# Patient Record
Sex: MALE | Race: WHITE | Employment: FULL TIME | URBAN - NONMETROPOLITAN AREA
[De-identification: names, ages, dates, MRNs, and addresses within clinical notes are randomized per-mention and may not be internally consistent; named-entity substitution may affect disease eponyms.]

---

## 2023-01-28 ENCOUNTER — APPOINTMENT (OUTPATIENT)
Dept: GENERAL RADIOLOGY | Age: 63
End: 2023-01-28
Payer: COMMERCIAL

## 2023-01-28 ENCOUNTER — HOSPITAL ENCOUNTER (EMERGENCY)
Age: 63
Discharge: ANOTHER HOSPITAL | End: 2023-01-28
Attending: EMERGENCY MEDICINE
Payer: COMMERCIAL

## 2023-01-28 VITALS
BODY MASS INDEX: 29.52 KG/M2 | WEIGHT: 230 LBS | DIASTOLIC BLOOD PRESSURE: 85 MMHG | RESPIRATION RATE: 14 BRPM | HEIGHT: 74 IN | SYSTOLIC BLOOD PRESSURE: 120 MMHG | TEMPERATURE: 97.6 F | OXYGEN SATURATION: 97 % | HEART RATE: 69 BPM

## 2023-01-28 DIAGNOSIS — I21.4 NON-STEMI (NON-ST ELEVATED MYOCARDIAL INFARCTION) (HCC): Primary | ICD-10-CM

## 2023-01-28 LAB
ALBUMIN SERPL BCP-MCNC: 4.3 GM/DL (ref 3.4–5)
ALP SERPL-CCNC: 69 U/L (ref 46–116)
ALT SERPL W P-5'-P-CCNC: 35 U/L (ref 14–63)
ANION GAP SERPL CALC-SCNC: 9 MEQ/L (ref 8–16)
APTT: 29.1 SECONDS (ref 22–38)
AST SERPL W P-5'-P-CCNC: 28 U/L (ref 15–37)
BASOPHILS # BLD: 0.3 % (ref 0–3)
BASOPHILS ABSOLUTE: 0 THOU/MM3 (ref 0–0.1)
BILIRUB SERPL-MCNC: 0.5 MG/DL (ref 0.2–1)
BUN SERPL-MCNC: 17 MG/DL (ref 7–18)
CALCIUM SERPL-MCNC: 8.8 MG/DL (ref 8.5–10.1)
CHLORIDE SERPL-SCNC: 102 MEQ/L (ref 98–107)
CO2 SERPL-SCNC: 31 MEQ/L (ref 21–32)
CREAT SERPL-MCNC: 1.1 MG/DL (ref 0.6–1.3)
EKG ATRIAL RATE: 77 BPM
EKG ATRIAL RATE: 91 BPM
EKG P AXIS: 31 DEGREES
EKG P AXIS: 36 DEGREES
EKG P-R INTERVAL: 158 MS
EKG P-R INTERVAL: 164 MS
EKG Q-T INTERVAL: 364 MS
EKG Q-T INTERVAL: 386 MS
EKG QRS DURATION: 92 MS
EKG QRS DURATION: 94 MS
EKG QTC CALCULATION (BAZETT): 436 MS
EKG QTC CALCULATION (BAZETT): 447 MS
EKG R AXIS: 28 DEGREES
EKG R AXIS: 42 DEGREES
EKG T AXIS: 14 DEGREES
EKG T AXIS: 24 DEGREES
EKG VENTRICULAR RATE: 77 BPM
EKG VENTRICULAR RATE: 91 BPM
EOSINOPHILS ABSOLUTE: 0.1 THOU/MM3 (ref 0–0.5)
EOSINOPHILS RELATIVE PERCENT: 0.7 % (ref 0–4)
GFR SERPL CREATININE-BSD FRML MDRD: > 60 ML/MIN/1.73M2
GLUCOSE SERPL-MCNC: 102 MG/DL (ref 74–106)
HCT VFR BLD CALC: 43.6 % (ref 42–52)
HEMOGLOBIN: 15.2 GM/DL (ref 14–18)
IMMATURE GRANS (ABS): 0.01 THOU/MM3 (ref 0–0.07)
IMMATURE GRANULOCYTES: 0 %
INR BLD: 1.12 (ref 0.85–1.13)
LYMPHOCYTES # BLD AUTO: 16.8 % (ref 15–47)
LYMPHOCYTES ABSOLUTE: 1.3 THOU/MM3 (ref 1–4.8)
MCH RBC QN AUTO: 30.6 PG (ref 26–32)
MCHC RBC AUTO-ENTMCNC: 34.9 GM/DL (ref 31–35)
MCV RBC AUTO: 87.9 FL (ref 80–94)
MONOCYTES: 0.6 THOU/MM3 (ref 0.3–1.3)
MONOCYTES: 7.7 % (ref 0–12)
PDW BLD-RTO: 12.6 % (ref 11.5–14.9)
PLATELET # BLD AUTO: 174 THOU/MM3 (ref 130–400)
PMV BLD AUTO: 11 FL (ref 9.4–12.4)
POTASSIUM SERPL-SCNC: 3.5 MEQ/L (ref 3.5–5.1)
PROT SERPL-MCNC: 7.7 GM/DL (ref 6.4–8.2)
RBC # BLD: 4.96 MILL/MM3 (ref 4.5–6.1)
SEG NEUTROPHILS: 74.4 % (ref 43–75)
SEGMENTED NEUTROPHILS ABSOLUTE COUNT: 5.7 THOU/MM3 (ref 1.8–7.7)
SODIUM SERPL-SCNC: 142 MEQ/L (ref 136–145)
TROPONIN, HIGH SENSITIVITY: 290 PG/ML (ref 0–76.1)
TROPONIN, HIGH SENSITIVITY: 320.7 PG/ML (ref 0–76.1)
WBC # BLD: 7.7 THOU/MM3 (ref 4.8–10.8)

## 2023-01-28 PROCEDURE — 85025 COMPLETE CBC W/AUTO DIFF WBC: CPT

## 2023-01-28 PROCEDURE — 93005 ELECTROCARDIOGRAM TRACING: CPT | Performed by: EMERGENCY MEDICINE

## 2023-01-28 PROCEDURE — 84484 ASSAY OF TROPONIN QUANT: CPT

## 2023-01-28 PROCEDURE — 6360000002 HC RX W HCPCS

## 2023-01-28 PROCEDURE — 85730 THROMBOPLASTIN TIME PARTIAL: CPT

## 2023-01-28 PROCEDURE — 93010 ELECTROCARDIOGRAM REPORT: CPT | Performed by: INTERNAL MEDICINE

## 2023-01-28 PROCEDURE — 36415 COLL VENOUS BLD VENIPUNCTURE: CPT

## 2023-01-28 PROCEDURE — 80053 COMPREHEN METABOLIC PANEL: CPT

## 2023-01-28 PROCEDURE — 99285 EMERGENCY DEPT VISIT HI MDM: CPT

## 2023-01-28 PROCEDURE — 85610 PROTHROMBIN TIME: CPT

## 2023-01-28 PROCEDURE — 96374 THER/PROPH/DIAG INJ IV PUSH: CPT

## 2023-01-28 PROCEDURE — 6370000000 HC RX 637 (ALT 250 FOR IP): Performed by: EMERGENCY MEDICINE

## 2023-01-28 PROCEDURE — 71045 X-RAY EXAM CHEST 1 VIEW: CPT

## 2023-01-28 RX ORDER — HEPARIN SODIUM 1000 [USP'U]/ML
60 INJECTION, SOLUTION INTRAVENOUS; SUBCUTANEOUS PRN
Status: DISCONTINUED | OUTPATIENT
Start: 2023-01-28 | End: 2023-01-28

## 2023-01-28 RX ORDER — HEPARIN SODIUM 1000 [USP'U]/ML
30 INJECTION, SOLUTION INTRAVENOUS; SUBCUTANEOUS PRN
Status: DISCONTINUED | OUTPATIENT
Start: 2023-01-28 | End: 2023-01-28

## 2023-01-28 RX ORDER — HEPARIN SODIUM 10000 [USP'U]/100ML
5-30 INJECTION, SOLUTION INTRAVENOUS CONTINUOUS
Status: DISCONTINUED | OUTPATIENT
Start: 2023-01-28 | End: 2023-01-28 | Stop reason: HOSPADM

## 2023-01-28 RX ORDER — HEPARIN SODIUM 10000 [USP'U]/100ML
INJECTION, SOLUTION INTRAVENOUS
Status: COMPLETED
Start: 2023-01-28 | End: 2023-01-28

## 2023-01-28 RX ORDER — HEPARIN SODIUM 1000 [USP'U]/ML
4000 INJECTION, SOLUTION INTRAVENOUS; SUBCUTANEOUS PRN
Status: DISCONTINUED | OUTPATIENT
Start: 2023-01-28 | End: 2023-01-28 | Stop reason: HOSPADM

## 2023-01-28 RX ORDER — HEPARIN SODIUM 1000 [USP'U]/ML
2000 INJECTION, SOLUTION INTRAVENOUS; SUBCUTANEOUS PRN
Status: DISCONTINUED | OUTPATIENT
Start: 2023-01-28 | End: 2023-01-28 | Stop reason: HOSPADM

## 2023-01-28 RX ORDER — HEPARIN SODIUM 1000 [USP'U]/ML
4000 INJECTION, SOLUTION INTRAVENOUS; SUBCUTANEOUS ONCE
Status: DISCONTINUED | OUTPATIENT
Start: 2023-01-28 | End: 2023-01-28 | Stop reason: HOSPADM

## 2023-01-28 RX ORDER — CLOPIDOGREL 300 MG/1
300 TABLET, FILM COATED ORAL ONCE
Status: COMPLETED | OUTPATIENT
Start: 2023-01-28 | End: 2023-01-28

## 2023-01-28 RX ORDER — HEPARIN SODIUM 10000 [USP'U]/100ML
14 INJECTION, SOLUTION INTRAVENOUS CONTINUOUS
Status: DISCONTINUED | OUTPATIENT
Start: 2023-01-28 | End: 2023-01-28 | Stop reason: DRUGHIGH

## 2023-01-28 RX ORDER — HEPARIN SODIUM 1000 [USP'U]/ML
INJECTION, SOLUTION INTRAVENOUS; SUBCUTANEOUS
Status: COMPLETED
Start: 2023-01-28 | End: 2023-01-28

## 2023-01-28 RX ORDER — HEPARIN SODIUM 1000 [USP'U]/ML
4000 INJECTION, SOLUTION INTRAVENOUS; SUBCUTANEOUS ONCE
Status: DISCONTINUED | OUTPATIENT
Start: 2023-01-28 | End: 2023-01-28 | Stop reason: SDUPTHER

## 2023-01-28 RX ADMIN — HEPARIN SODIUM 9 UNITS/KG/HR: 10000 INJECTION, SOLUTION INTRAVENOUS at 15:48

## 2023-01-28 RX ADMIN — HEPARIN SODIUM 4000 UNITS: 1000 INJECTION, SOLUTION INTRAVENOUS; SUBCUTANEOUS at 15:45

## 2023-01-28 RX ADMIN — HEPARIN SODIUM AND DEXTROSE 9 UNITS/KG/HR: 10000; 5 INJECTION INTRAVENOUS at 15:48

## 2023-01-28 RX ADMIN — CLOPIDOGREL BISULFATE 300 MG: 300 TABLET, FILM COATED ORAL at 15:44

## 2023-01-28 ASSESSMENT — ENCOUNTER SYMPTOMS
ABDOMINAL PAIN: 0
NAUSEA: 0
COUGH: 0
WHEEZING: 0

## 2023-01-28 ASSESSMENT — PAIN DESCRIPTION - LOCATION: LOCATION: CHEST

## 2023-01-28 ASSESSMENT — PAIN - FUNCTIONAL ASSESSMENT: PAIN_FUNCTIONAL_ASSESSMENT: 0-10

## 2023-01-28 ASSESSMENT — PAIN DESCRIPTION - ORIENTATION: ORIENTATION: LEFT

## 2023-01-28 ASSESSMENT — PAIN DESCRIPTION - PAIN TYPE: TYPE: ACUTE PAIN

## 2023-01-28 ASSESSMENT — PAIN SCALES - GENERAL: PAINLEVEL_OUTOF10: 3

## 2023-01-28 ASSESSMENT — PAIN DESCRIPTION - DESCRIPTORS: DESCRIPTORS: ACHING

## 2023-01-28 ASSESSMENT — HEART SCORE
ECG: 1
ECG: 1

## 2023-01-28 NOTE — ED PROVIDER NOTES
[facility]  eMERGENCY dEPARTMENT eNCOUnter             [place] [address] COMPLAINT    Chief Complaint   Patient presents with    Chest Pain     Left chest pain after working out today. Took a corey ASA at home. Nurses Notes reviewed and I agree except as noted in the HPI. HPI    Bhavesh Wen is a 65 y.o. male who presents stating that at noon today, after exercising for 30 minutes, he developed left-sided chest pain with shortness of breath. He had a similar episode a week ago. He has also noticed that when he walks upstairs he gets short of breath easier than he used to. On arrival, he stated he had chest pain 3/10, but when I evaluated him, he said he had no chest pain. He has no known history of heart disease. Cardiovascular risk factors are family history, dyslipidemia, and hypertension. He is not on any prescribed medication currently. He thinks he had a stress test done about 15 years ago and was told it was normal.  He denies any recent respiratory illness. He does have fairly frequent GERD. REVIEW OF SYSTEMS      Review of Systems   Constitutional:  Negative for diaphoresis and fever. HENT:  Negative for congestion. Respiratory:  Negative for cough and wheezing. Cardiovascular:  Negative for palpitations and leg swelling. Gastrointestinal:  Negative for abdominal pain and nausea. Musculoskeletal:         No calf pain   Neurological:  Negative for dizziness, focal weakness and headaches. All other systems reviewed and are negative. PAST MEDICAL HISTORY     has no past medical history on file. SURGICAL HISTORY     has a past surgical history that includes Shoulder arthroscopy w/ labral repair (Right, 2022). CURRENT MEDICATIONS    Previous Medications    No medications on file       ALLERGIES    has No Known Allergies. FAMILY HISTORY    He indicated that the status of his father is unknown.  He indicated that the status of his brother is unknown.   family history includes Heart Disease in his brother and father; High Blood Pressure in his father.    SOCIAL HISTORY     reports that he has never smoked. He has never used smokeless tobacco. He reports current alcohol use.    PHYSICAL EXAM       INITIAL VITALS: /83   Pulse 81   Temp 97.6 °F (36.4 °C) (Temporal)   Resp 27   Ht 6' 2\" (1.88 m)   Wt 230 lb (104.3 kg)   SpO2 96%   BMI 29.53 kg/m²      Physical Exam  Vitals and nursing note reviewed. Exam conducted with a chaperone present.   Constitutional:       General: He is not in acute distress.     Appearance: He is not ill-appearing.   HENT:      Mouth/Throat:      Mouth: Mucous membranes are moist.   Eyes:      Pupils: Pupils are equal, round, and reactive to light.   Cardiovascular:      Rate and Rhythm: Normal rate and regular rhythm.      Heart sounds: No murmur heard.  Pulmonary:      Effort: Pulmonary effort is normal. No respiratory distress.      Breath sounds: Normal breath sounds. No wheezing.   Abdominal:      General: Bowel sounds are normal.      Palpations: Abdomen is soft. There is no mass.      Tenderness: There is no abdominal tenderness.   Musculoskeletal:         General: No swelling or tenderness.      Cervical back: Neck supple.      Right lower leg: No edema.      Left lower leg: No edema.   Lymphadenopathy:      Cervical: No cervical adenopathy.   Skin:     General: Skin is warm and dry.   Neurological:      General: No focal deficit present.      Mental Status: He is alert and oriented to person, place, and time.   Psychiatric:         Behavior: Behavior normal.        DIFFERENTIAL DIAGNOSIS:    STEMI versus non-STEMI versus unstable angina versus noncardiac cause      DIAGNOSTIC RESULTS    EKG     #1, done on arrival, shows normal sinus rhythm, nonspecific T wave changes no acute pattern of infarct.    #2, done about 30 minutes later, shows normal sinus rhythm, normal axis, normal  intervals, normal EKG. RADIOLOGY:    XR CHEST PORTABLE   Final Result   Normal chest.               **This report has been created using voice recognition software. It may contain minor errors which are inherent in voice recognition technology. **      Final report electronically signed by Dr. Neyda Monae on 1/28/2023 3:21 PM            LABS:     Labs Reviewed   TROPONIN - Abnormal; Notable for the following components:       Result Value    Troponin, High Sensitivity 290.0 (*)     All other components within normal limits   TROPONIN - Abnormal; Notable for the following components:    Troponin, High Sensitivity 320.7 (*)     All other components within normal limits   CBC WITH AUTO DIFFERENTIAL   COMPREHENSIVE METABOLIC PANEL   GLOMERULAR FILTRATION RATE, ESTIMATED   ANION GAP   PROTIME-INR   APTT       Heart Score is 7    Vitals:    Vitals:    01/28/23 1448 01/28/23 1450 01/28/23 1505   BP: (!) 152/79 (!) 152/79 133/83   Pulse: 95 84 81   Resp: 16 18 27   Temp: 97.6 °F (36.4 °C)     TempSrc: Temporal     SpO2: 98% 97% 96%   Weight: 230 lb (104.3 kg)     Height: 6' 2\" (1.88 m)         EMERGENCY DEPARTMENT COURSE:      He took aspirin 325 mg orally at home. After his elevated troponin I came back, he was given Plavix 300 mg orally, and heparin bolus and drip, low-dose protocol. He remains pain-free. He remains in normal sinus rhythm. He desires to go to [facility] for cardiac care. I spoke with Dr. Reyna Cooper, hospitalist on-call there, who agreed to accept him in transfer. Ambulance transport arranged.       NUMBER OF PROBLEMS ADDRESSED: Chest pain    COMPLEXITY/SEVERITY OF PROBLEMS ADDRESSED: Severe    REPEAT ASSESSMENTS:    Repeated evaluation of pain, vital signs, notification of patient of test results and need for admission    RESULTS AND DISPOSITION DISCUSSED WITH: The patient and his wife, hospitalist at the receiving facility    SHARED DESCISION MAKING: Patient and his wife    Dewayne Dickey CONSULTED: Dr. Angela Goodwin, hospitalist at [facility] in [address]:        [address] ED: Aspirin, Plavix, heparin bolus and drip    CRITICAL CARE TIME: 30 minutes    Includes evaluation of potentially life-threatening condition with high level of liability, evaluation of multiple laboratory values, 2 EKGs, telephone discussions with nursing supervisor and receiving physician at [facility] in [facility]        High probability of clinically significant/life threatening emergency with deterioration in patient condition requiring urgent intervention. CRITICAL CARE:     30 min    CONSULTS:    Dr. Angela Goodwin, hospitalist at [facility]. FINAL IMPRESSION      1.  Non-STEMI (non-ST elevated myocardial infarction) [facility])        DISPOSITION/PLAN    DISPOSITION Decision To Transfer 01/28/2023 03:56:51 PM  To [facility], ambulance transport, to be admitted to the care of Dr. Angela Goodwin.      (Please note that portions of this note were completed with a voice recognition program.  Efforts were made to edit the dictations but occasionally words are mis-transcribed.)       Denis Small MD  01/28/23 0135

## 2023-01-28 NOTE — ED NOTES
Pt is pink, warm and dry, breathing with ease. No chest pain at this time. No nausea, diaphoresis, SOB or edema. IV heparin infusing per MAR. Monitor showing NSR. Pt transported to [facility] per EMS in stable condition.       Vane Tucker RN  01/28/23 1095

## 2023-01-28 NOTE — ED TRIAGE NOTES
Pt with left chest pain and SOB with working out last weekend. Today worked out again and got chest pain, SOB and felt some discomfort in both arms. Now left chest pain =3/10, and faintly feels discomfort in left arm. No edema.

## 2023-01-28 NOTE — ED NOTES
Dr. Heide Cates on the phone with [facility] .      Barbara Emerson RN  01/28/23 3762